# Patient Record
Sex: MALE | Race: WHITE | Employment: PART TIME | URBAN - METROPOLITAN AREA
[De-identification: names, ages, dates, MRNs, and addresses within clinical notes are randomized per-mention and may not be internally consistent; named-entity substitution may affect disease eponyms.]

---

## 2019-07-31 ENCOUNTER — OFFICE VISIT (OUTPATIENT)
Dept: URGENT CARE | Facility: CLINIC | Age: 69
End: 2019-07-31
Payer: COMMERCIAL

## 2019-07-31 VITALS
DIASTOLIC BLOOD PRESSURE: 90 MMHG | OXYGEN SATURATION: 96 % | HEIGHT: 70 IN | TEMPERATURE: 98.3 F | RESPIRATION RATE: 16 BRPM | SYSTOLIC BLOOD PRESSURE: 148 MMHG | WEIGHT: 193 LBS | BODY MASS INDEX: 27.63 KG/M2 | HEART RATE: 83 BPM

## 2019-07-31 DIAGNOSIS — J04.0 ACUTE LARYNGITIS: ICD-10-CM

## 2019-07-31 DIAGNOSIS — J02.9 SORE THROAT: Primary | ICD-10-CM

## 2019-07-31 LAB — S PYO AG THROAT QL: NEGATIVE

## 2019-07-31 PROCEDURE — 99213 OFFICE O/P EST LOW 20 MIN: CPT | Performed by: EMERGENCY MEDICINE

## 2019-07-31 PROCEDURE — 87880 STREP A ASSAY W/OPTIC: CPT | Performed by: EMERGENCY MEDICINE

## 2019-07-31 RX ORDER — AZITHROMYCIN 500 MG/1
500 TABLET, FILM COATED ORAL DAILY
Qty: 3 TABLET | Refills: 0 | Status: SHIPPED | OUTPATIENT
Start: 2019-07-31 | End: 2019-08-03

## 2019-07-31 RX ORDER — LORATADINE 10 MG/1
10 TABLET ORAL DAILY
COMMUNITY

## 2019-07-31 NOTE — PROGRESS NOTES
Assessment/Plan:    No problem-specific Assessment & Plan notes found for this encounter  Diagnoses and all orders for this visit:    Sore throat  -     POCT rapid strepA  -     azithromycin (ZITHROMAX) 500 MG tablet; Take 1 tablet (500 mg total) by mouth daily for 3 days    Acute laryngitis    Other orders  -     loratadine (CLARITIN) 10 mg tablet; Take 10 mg by mouth daily          Subjective:      Patient ID: Aleena Mitchell is a 79 y o  male  Sore throat, hoarseness for 2 days; denies fever, cough, sinus congestion    Sore Throat    This is a new problem  The current episode started in the past 7 days  The problem has been unchanged  Neither side of throat is experiencing more pain than the other  There has been no fever  The pain is at a severity of 3/10  The pain is mild  He has tried nothing for the symptoms  The treatment provided no relief  The following portions of the patient's history were reviewed and updated as appropriate: allergies, current medications, past family history, past medical history, past social history, past surgical history and problem list     Review of Systems   HENT: Positive for sore throat  All other systems reviewed and are negative  Objective:      /90   Pulse 83   Temp 98 3 °F (36 8 °C)   Resp 16   Ht 5' 10" (1 778 m)   Wt 87 5 kg (193 lb)   SpO2 96%   BMI 27 69 kg/m²          Physical Exam   Constitutional: He is oriented to person, place, and time  He appears well-developed and well-nourished  HENT:   Right Ear: Tympanic membrane and ear canal normal    Left Ear: Tympanic membrane and ear canal normal    Mouth/Throat: Mucous membranes are normal  Posterior oropharyngeal erythema present  Eyes: Pupils are equal, round, and reactive to light  Cardiovascular: Normal rate  Pulmonary/Chest: Effort normal    Abdominal: Soft  Neurological: He is alert and oriented to person, place, and time  Skin: Skin is warm and dry     Psychiatric: He has a normal mood and affect  His behavior is normal    Nursing note and vitals reviewed

## 2019-07-31 NOTE — PATIENT INSTRUCTIONS
Warm salt water gargles 3-4 x a day, Cepacol, Chloraseptic or Sucrets for throat pain, Zithromax daily for 3 days, recheck next week if symptoms persist

## 2021-03-04 DIAGNOSIS — Z23 ENCOUNTER FOR IMMUNIZATION: ICD-10-CM

## 2021-03-19 ENCOUNTER — IMMUNIZATIONS (OUTPATIENT)
Dept: FAMILY MEDICINE CLINIC | Facility: HOSPITAL | Age: 71
End: 2021-03-19

## 2021-03-19 DIAGNOSIS — Z23 ENCOUNTER FOR IMMUNIZATION: Primary | ICD-10-CM

## 2021-03-19 PROCEDURE — 91300 SARS-COV-2 / COVID-19 MRNA VACCINE (PFIZER-BIONTECH) 30 MCG: CPT

## 2021-03-19 PROCEDURE — 0001A SARS-COV-2 / COVID-19 MRNA VACCINE (PFIZER-BIONTECH) 30 MCG: CPT

## 2021-04-11 ENCOUNTER — IMMUNIZATIONS (OUTPATIENT)
Dept: FAMILY MEDICINE CLINIC | Facility: HOSPITAL | Age: 71
End: 2021-04-11

## 2021-04-11 DIAGNOSIS — Z23 ENCOUNTER FOR IMMUNIZATION: Primary | ICD-10-CM

## 2021-04-11 PROCEDURE — 0002A SARS-COV-2 / COVID-19 MRNA VACCINE (PFIZER-BIONTECH) 30 MCG: CPT

## 2021-04-11 PROCEDURE — 91300 SARS-COV-2 / COVID-19 MRNA VACCINE (PFIZER-BIONTECH) 30 MCG: CPT

## 2023-03-03 ENCOUNTER — APPOINTMENT (EMERGENCY)
Dept: CT IMAGING | Facility: HOSPITAL | Age: 73
End: 2023-03-03

## 2023-03-03 ENCOUNTER — HOSPITAL ENCOUNTER (EMERGENCY)
Facility: HOSPITAL | Age: 73
Discharge: HOME | End: 2023-03-03
Attending: EMERGENCY MEDICINE

## 2023-03-03 VITALS
RESPIRATION RATE: 16 BRPM | DIASTOLIC BLOOD PRESSURE: 78 MMHG | HEART RATE: 65 BPM | SYSTOLIC BLOOD PRESSURE: 153 MMHG | TEMPERATURE: 97.3 F | OXYGEN SATURATION: 97 %

## 2023-03-03 DIAGNOSIS — R77.8 ELEVATED TROPONIN: ICD-10-CM

## 2023-03-03 DIAGNOSIS — K82.9 GALLBLADDER DISEASE: ICD-10-CM

## 2023-03-03 DIAGNOSIS — I71.9 AORTIC ANEURYSM (HCC): ICD-10-CM

## 2023-03-03 DIAGNOSIS — R10.9 ABDOMINAL PAIN: Primary | ICD-10-CM

## 2023-03-03 LAB
2HR DELTA HS TROPONIN: 17 NG/L
4HR DELTA HS TROPONIN: 14 NG/L
ALBUMIN SERPL BCP-MCNC: 4.6 G/DL (ref 3.5–5)
ALP SERPL-CCNC: 67 U/L (ref 34–104)
ALT SERPL W P-5'-P-CCNC: 19 U/L (ref 7–52)
ANION GAP SERPL CALCULATED.3IONS-SCNC: 10 MMOL/L (ref 4–13)
APTT PPP: 25 SECONDS (ref 23–37)
AST SERPL W P-5'-P-CCNC: 22 U/L (ref 13–39)
ATRIAL RATE: 91 BPM
BACTERIA UR QL AUTO: NORMAL /HPF
BASOPHILS # BLD MANUAL: 0 THOUSAND/UL (ref 0–0.1)
BASOPHILS NFR MAR MANUAL: 0 % (ref 0–1)
BILIRUB SERPL-MCNC: 1.15 MG/DL (ref 0.2–1)
BILIRUB UR QL STRIP: NEGATIVE
BUN SERPL-MCNC: 16 MG/DL (ref 5–25)
CALCIUM SERPL-MCNC: 9.6 MG/DL (ref 8.4–10.2)
CARDIAC TROPONIN I PNL SERPL HS: 18 NG/L
CARDIAC TROPONIN I PNL SERPL HS: 32 NG/L
CARDIAC TROPONIN I PNL SERPL HS: 35 NG/L
CHLORIDE SERPL-SCNC: 100 MMOL/L (ref 96–108)
CLARITY UR: CLEAR
CO2 SERPL-SCNC: 27 MMOL/L (ref 21–32)
COLOR UR: YELLOW
CREAT SERPL-MCNC: 0.87 MG/DL (ref 0.6–1.3)
EOSINOPHIL # BLD MANUAL: 0 THOUSAND/UL (ref 0–0.4)
EOSINOPHIL NFR BLD MANUAL: 0 % (ref 0–6)
ERYTHROCYTE [DISTWIDTH] IN BLOOD BY AUTOMATED COUNT: 12.5 % (ref 11.6–15.1)
GFR SERPL CREATININE-BSD FRML MDRD: 87 ML/MIN/1.73SQ M
GLUCOSE SERPL-MCNC: 139 MG/DL (ref 65–140)
GLUCOSE UR STRIP-MCNC: NEGATIVE MG/DL
HCT VFR BLD AUTO: 50.5 % (ref 36.5–49.3)
HGB BLD-MCNC: 16.8 G/DL (ref 12–17)
HGB UR QL STRIP.AUTO: NEGATIVE
INR PPP: 0.88 (ref 0.84–1.19)
KETONES UR STRIP-MCNC: ABNORMAL MG/DL
LEUKOCYTE ESTERASE UR QL STRIP: NEGATIVE
LIPASE SERPL-CCNC: 29 U/L (ref 11–82)
LYMPHOCYTES # BLD AUTO: 0.31 THOUSAND/UL (ref 0.6–4.47)
LYMPHOCYTES # BLD AUTO: 3 % (ref 14–44)
MCH RBC QN AUTO: 30.3 PG (ref 26.8–34.3)
MCHC RBC AUTO-ENTMCNC: 33.3 G/DL (ref 31.4–37.4)
MCV RBC AUTO: 91 FL (ref 82–98)
MONOCYTES # BLD AUTO: 0.21 THOUSAND/UL (ref 0–1.22)
MONOCYTES NFR BLD: 2 % (ref 4–12)
NEUTROPHILS # BLD MANUAL: 9.91 THOUSAND/UL (ref 1.85–7.62)
NEUTS BAND NFR BLD MANUAL: 3 % (ref 0–8)
NEUTS SEG NFR BLD AUTO: 92 % (ref 43–75)
NITRITE UR QL STRIP: NEGATIVE
NON-SQ EPI CELLS URNS QL MICRO: NORMAL /HPF
P AXIS: 66 DEGREES
PH UR STRIP.AUTO: 6.5 [PH]
PLATELET # BLD AUTO: 149 THOUSANDS/UL (ref 149–390)
PLATELET BLD QL SMEAR: ADEQUATE
PMV BLD AUTO: 10.6 FL (ref 8.9–12.7)
POTASSIUM SERPL-SCNC: 3.8 MMOL/L (ref 3.5–5.3)
PR INTERVAL: 168 MS
PROT SERPL-MCNC: 7.8 G/DL (ref 6.4–8.4)
PROT UR STRIP-MCNC: ABNORMAL MG/DL
PROTHROMBIN TIME: 12.6 SECONDS (ref 11.6–14.5)
QRS AXIS: -58 DEGREES
QRSD INTERVAL: 106 MS
QT INTERVAL: 500 MS
QTC INTERVAL: 486 MS
RBC # BLD AUTO: 5.54 MILLION/UL (ref 3.88–5.62)
RBC #/AREA URNS AUTO: NORMAL /HPF
RBC MORPH BLD: NORMAL
SODIUM SERPL-SCNC: 137 MMOL/L (ref 135–147)
SP GR UR STRIP.AUTO: 1.02 (ref 1–1.03)
T WAVE AXIS: 25 DEGREES
UROBILINOGEN UR STRIP-ACNC: <2 MG/DL
VENTRICULAR RATE: 57 BPM
WBC # BLD AUTO: 10.43 THOUSAND/UL (ref 4.31–10.16)
WBC #/AREA URNS AUTO: NORMAL /HPF

## 2023-03-03 RX ORDER — FENTANYL CITRATE 50 UG/ML
50 INJECTION, SOLUTION INTRAMUSCULAR; INTRAVENOUS ONCE
Status: COMPLETED | OUTPATIENT
Start: 2023-03-03 | End: 2023-03-03

## 2023-03-03 RX ADMIN — IOHEXOL 100 ML: 350 INJECTION, SOLUTION INTRAVENOUS at 17:42

## 2023-03-03 RX ADMIN — SODIUM CHLORIDE 1000 ML: 0.9 INJECTION, SOLUTION INTRAVENOUS at 17:04

## 2023-03-03 RX ADMIN — FENTANYL CITRATE 50 MCG: 50 INJECTION INTRAMUSCULAR; INTRAVENOUS at 17:02

## 2023-03-03 NOTE — ED PROVIDER NOTES
History  Chief Complaint   Patient presents with   • Abdominal Pain     Patient c/o abdominal pain along with back pain along with hypertension that started this morning  Patient states"pain radiates from abdomen around to back like a band around mid section"  Patient was sent by PCP for evaluation  Patient reports spitting up but not vomiting  Patient denies diarrhea  Patient is a 80 y/o M with h/o CAD and HTN that presents to the ED with abdominal pain radiating to back that started this morning around 9AM   He states he had this same pain off and on for the past 10 days, but today the pain is constant and won't go away  He denies nausea, but has had vomiting off and on  No diarrhea or constipation  Last BM was this morning  No black or bloody stools  Patient does drink alcohol daily and states he has one drink with dinner  He did not take anything for pain  Nothing makes it worse, nothing makes it better  History provided by:  Patient  Abdominal Pain  Pain location:  Periumbilical  Pain quality: aching    Pain radiates to:  Back  Pain severity:  Moderate  Onset quality:  Sudden  Duration:  1 day  Timing:  Constant  Progression:  Worsening  Chronicity:  New  Context: not sick contacts, not suspicious food intake and not trauma    Relieved by:  Nothing  Worsened by:  Nothing  Ineffective treatments:  None tried  Associated symptoms: vomiting    Associated symptoms: no chest pain, no chills, no constipation, no cough, no diarrhea, no dysuria, no fever, no nausea and no sore throat    Risk factors: alcohol abuse and being elderly        Prior to Admission Medications   Prescriptions Last Dose Informant Patient Reported?  Taking?   loratadine (CLARITIN) 10 mg tablet   Yes No   Sig: Take 10 mg by mouth daily      Facility-Administered Medications: None       Past Medical History:   Diagnosis Date   • Coronary artery disease    • Hypertension        Past Surgical History:   Procedure Laterality Date   • BACK SURGERY     • CARDIAC OTHER      CABG open heart 2019 @ [facility]       History reviewed  No pertinent family history  I have reviewed and agree with the history as documented  E-Cigarette/Vaping   • E-Cigarette Use Never User      E-Cigarette/Vaping Substances     Social History     Tobacco Use   • Smoking status: Former   • Smokeless tobacco: Never   Vaping Use   • Vaping Use: Never used   Substance Use Topics   • Alcohol use: Yes     Comment: 1 drink per day   • Drug use: Yes     Types: Marijuana     Comment: daily       Review of Systems   Constitutional: Negative for chills and fever  HENT: Negative for sore throat  Respiratory: Negative for cough  Cardiovascular: Negative for chest pain  Gastrointestinal: Positive for abdominal pain and vomiting  Negative for blood in stool, constipation, diarrhea and nausea  Genitourinary: Negative for dysuria  Musculoskeletal: Positive for back pain  Skin: Negative for color change, pallor and rash  Neurological: Negative for dizziness, weakness, light-headedness and numbness  Psychiatric/Behavioral: Negative for confusion  All other systems reviewed and are negative  Physical Exam  Physical Exam  Vitals and nursing note reviewed  Constitutional:       General: He is not in acute distress  Appearance: Normal appearance  He is well-developed, well-groomed and normal weight  He is not ill-appearing or diaphoretic  HENT:      Head: Normocephalic and atraumatic  Right Ear: External ear normal       Left Ear: External ear normal       Nose: Nose normal       Mouth/Throat:      Mouth: Mucous membranes are moist    Eyes:      Conjunctiva/sclera: Conjunctivae normal       Pupils: Pupils are equal    Cardiovascular:      Rate and Rhythm: Normal rate and regular rhythm  Heart sounds: Normal heart sounds  Pulmonary:      Effort: Pulmonary effort is normal       Breath sounds: Normal breath sounds   No wheezing, rhonchi or rales    Abdominal:      General: Abdomen is flat  Bowel sounds are normal       Palpations: Abdomen is soft  Tenderness: There is no abdominal tenderness  There is no guarding or rebound  Musculoskeletal:      Cervical back: Normal range of motion and neck supple  Right lower leg: No edema  Left lower leg: No edema  Skin:     General: Skin is warm and dry  Coloration: Skin is not jaundiced or pale  Findings: No rash  Neurological:      General: No focal deficit present  Mental Status: He is alert and oriented to person, place, and time  Cranial Nerves: No cranial nerve deficit  Motor: No weakness  Psychiatric:         Mood and Affect: Mood normal          Behavior: Behavior is cooperative  Vital Signs  ED Triage Vitals   Temperature Pulse Respirations Blood Pressure SpO2   03/03/23 1533 03/03/23 1533 03/03/23 1533 03/03/23 1535 03/03/23 1533   (!) 97 3 °F (36 3 °C) (!) 46 18 (!) 215/103 97 %      Temp src Heart Rate Source Patient Position - Orthostatic VS BP Location FiO2 (%)   -- 03/03/23 1630 03/03/23 1630 03/03/23 1630 --    Monitor Lying Left arm       Pain Score       03/03/23 1533       8           Vitals:    03/03/23 1830 03/03/23 1900 03/03/23 1930 03/03/23 2000   BP: 168/88 163/78 158/82 126/85   Pulse: 64 70 76 75   Patient Position - Orthostatic VS: Lying Lying Lying Lying         Visual Acuity      ED Medications  Medications   fentanyl citrate (PF) 100 MCG/2ML 50 mcg (50 mcg Intravenous Given 3/3/23 1702)   sodium chloride 0 9 % bolus 1,000 mL (0 mL Intravenous Stopped 3/3/23 1800)   iohexol (OMNIPAQUE) 350 MG/ML injection (SINGLE-DOSE) 100 mL (100 mL Intravenous Given 3/3/23 1742)       Diagnostic Studies  Results Reviewed     Procedure Component Value Units Date/Time    HS Troponin I 4hr [[id number]] Collected: 03/03/23 2043    Lab Status:  In process Specimen: Blood from Arm, Right Updated: 03/03/23 2047    HS Troponin I 2hr [[id number]] (Normal) Collected: 03/03/23 1839    Lab Status: Final result Specimen: Blood from Arm, Right Updated: 03/03/23 1942     hs TnI 2hr 35 ng/L      Delta 2hr hsTnI 17 ng/L     Urine Microscopic [[id number]]  (Normal) Collected: 03/03/23 1704    Lab Status: Final result Specimen: Urine, Clean Catch Updated: 03/03/23 1839     RBC, UA 0-1 /hpf      WBC, UA None Seen /hpf      Epithelial Cells None Seen /hpf      Bacteria, UA None Seen /hpf     UA w Reflex to Microscopic w Reflex to Culture [[id number]]  (Abnormal) Collected: 03/03/23 1704    Lab Status: Final result Specimen: Urine, Clean Catch Updated: 03/03/23 1754     Color, UA Yellow     Clarity, UA Clear     Specific [place], UA 1 020     pH, UA 6 5     Leukocytes, UA Negative     Nitrite, UA Negative     Protein, UA Trace mg/dl      Glucose, UA Negative mg/dl      Ketones, UA 10 (1+) mg/dl      Urobilinogen, UA <2 0 mg/dl      Bilirubin, UA Negative     Occult Blood, UA Negative    CBC and differential [[id number]]  (Abnormal) Collected: 03/03/23 1623    Lab Status: Final result Specimen: Blood from Arm, Right Updated: 03/03/23 1731     WBC 10 43 Thousand/uL      RBC 5 54 Million/uL      Hemoglobin 16 8 g/dL      Hematocrit 50 5 %      MCV 91 fL      MCH 30 3 pg      MCHC 33 3 g/dL      RDW 12 5 %      MPV 10 6 fL      Platelets 213 Thousands/uL     Narrative: This is an appended report  These results have been appended to a previously verified report      Manual Differential(PHLEBS Do Not Order) [[id number]]  (Abnormal) Collected: 03/03/23 1623    Lab Status: Final result Specimen: Blood from Arm, Right Updated: 03/03/23 1731     Segmented % 92 %      Bands % 3 %      Lymphocytes % 3 %      Monocytes % 2 %      Eosinophils, % 0 %      Basophils % 0 %      Absolute Neutrophils 9 91 Thousand/uL      Lymphocytes Absolute 0 31 Thousand/uL      Monocytes Absolute 0 21 Thousand/uL      Eosinophils Absolute 0 00 Thousand/uL      Basophils Absolute 0 00 Thousand/uL Total Counted --     RBC Morphology Normal     Platelet Estimate Adequate    Protime-INR [[id number]]  (Normal) Collected: 03/03/23 1623    Lab Status: Final result Specimen: Blood from Arm, Right Updated: 03/03/23 1700     Protime 12 6 seconds      INR 0 88    APTT [[id number]]  (Normal) Collected: 03/03/23 1623    Lab Status: Final result Specimen: Blood from Arm, Right Updated: 03/03/23 1700     PTT 25 seconds     HS Troponin 0hr (reflex protocol) [[id number]]  (Normal) Collected: 03/03/23 1623    Lab Status: Final result Specimen: Blood from Arm, Right Updated: 03/03/23 1700     hs TnI 0hr 18 ng/L     Comprehensive metabolic panel [[id number]]  (Abnormal) Collected: 03/03/23 1623    Lab Status: Final result Specimen: Blood from Arm, Right Updated: 03/03/23 1654     Sodium 137 mmol/L      Potassium 3 8 mmol/L      Chloride 100 mmol/L      CO2 27 mmol/L      ANION GAP 10 mmol/L      BUN 16 mg/dL      Creatinine 0 87 mg/dL      Glucose 139 mg/dL      Calcium 9 6 mg/dL      AST 22 U/L      ALT 19 U/L      Alkaline Phosphatase 67 U/L      Total Protein 7 8 g/dL      Albumin 4 6 g/dL      Total Bilirubin 1 15 mg/dL      eGFR 87 ml/min/1 73sq m     Narrative:      [place] guidelines for Chronic Kidney Disease (CKD):   •  Stage 1 with normal or high GFR (GFR > 90 mL/min/1 73 square meters)  •  Stage 2 Mild CKD (GFR = 60-89 mL/min/1 73 square meters)  •  Stage 3A Moderate CKD (GFR = 45-59 mL/min/1 73 square meters)  •  Stage 3B Moderate CKD (GFR = 30-44 mL/min/1 73 square meters)  •  Stage 4 Severe CKD (GFR = 15-29 mL/min/1 73 square meters)  •  Stage 5 End Stage CKD (GFR <15 mL/min/1 73 square meters)  Note: GFR calculation is accurate only with a steady state creatinine    Lipase [[id number]]  (Normal) Collected: 03/03/23 1623    Lab Status: Final result Specimen: Blood from Arm, Right Updated: 03/03/23 1654     Lipase 29 u/L                  CTA dissection protocol chest abdomen pelvis w wo contrast   Final Result by Zenobia Hi MD (03/03 1853)         1  No dissection of the thoracic or abdominal aorta  2   Cholelithiasis with CT findings highly suspicious for acute cholecystitis  Evaluation with right upper quadrant ultrasound is recommended for confirmation  3  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  Follow-up evaluation with CT thorax is recommended in one year interval    4   Colonic diverticulosis with circular muscle hypertrophy at the level of the sigmoid colon  Correlate with colonoscopy to exclude underlying mucosal abnormality  The study was marked in [facility] for immediate notification  Workstation performed: [id number]         [address] [facility] right upper quadrant    (Results Pending)              Procedures  ECG 12 Lead Documentation Only    Date/Time: 3/3/2023 3:49 PM  Performed by: Kevan Menendez PA-C  Authorized by: Kevan Menendez PA-C     Indications / Diagnosis:  Abdominal pain  ECG reviewed by me, the ED Provider: yes    Patient location:  ED  Previous ECG:     Previous ECG:  Compared to current    Comparison ECG info:  PAC now present    Similarity:  Changes noted  Rate:     ECG rate:  57  Rhythm:     Rhythm: sinus bradycardia    Ectopy:     Ectopy: PAC    Conduction:     Conduction: abnormal      Abnormal conduction: incomplete RBBB    ST segments:     ST segments:  Normal  T waves:     T waves: normal               ED Course  ED Course as of 03/03/23 2100   Fri Mar 03, 2023   1902 Surgery paged concerning abnormal CT scan  Sylvie Rutherford from surgery currently in room with patient  2012 SLIM paged for admission  2033 Stephanie would like to wait for the 3rd troponin to result  Patient notified  According to note by surgery AP, patient can go home with outpatient ultrasound  2042 Care transferred to DR Saul Aguilera  Delta trop pending                 HEART Risk Score    Flowsheet Row Most Recent Value   Heart Score Risk Calculator History 0 Filed at: 03/03/2023 2023   ECG 1 Filed at: 03/03/2023 2023   Age 2 Filed at: 03/03/2023 2023   Risk Factors 2 Filed at: 03/03/2023 2023   Troponin 1 Filed at: 03/03/2023 2023   HEART Score 6 Filed at: 03/03/2023 2023                        SBIRT 22yo+    Flowsheet Row Most Recent Value   SBIRT (23 yo +)    In order to provide better care to our patients, we are screening all of our patients for alcohol and drug use  Would it be okay to ask you these screening questions? Yes Filed at: 03/03/2023 1700   Initial Alcohol Screen: US AUDIT-C     1  How often do you have a drink containing alcohol? 3 Filed at: 03/03/2023 1700   2  How many drinks containing alcohol do you have on a typical day you are drinking? 1 Filed at: 03/03/2023 1700   3a  Male UNDER 65: How often do you have five or more drinks on one occasion? 0 Filed at: 03/03/2023 1700   3b  FEMALE Any Age, or MALE 65+: How often do you have 4 or more drinks on one occassion? 0 Filed at: 03/03/2023 1700   Audit-C Score 4 Filed at: 03/03/2023 1700   EDY: How many times in the past year have you    Used an illegal drug or used a prescription medication for non-medical reasons? Never Filed at: 03/03/2023 1700                    Medical Decision Making  Patient with abdominal pain radiating to back upon arrival, will order labs, CT scan to r/o cardiopulmonary disease or aortic dissection  Patient with possible gallbladder disease on CT scan  Patient's pain resolved while in the ER, surgery consulted concerning abnormal CT scan  Surgery suggests outpatient follow up and u/s  Patient with elevated trop, elevated Delta trop, d/w SLIM repeat trop pending, care transferred to DR Hughes  Abdominal pain: acute illness or injury  Aortic aneurysm [facility]): acute illness or injury  Elevated troponin: acute illness or injury  Gallbladder disease: acute illness or injury  Amount and/or Complexity of Data Reviewed  External Data Reviewed: ECG    Labs: ordered  Radiology: ordered  ECG/medicine tests: ordered and independent interpretation performed  Risk  Prescription drug management  Disposition  Final diagnoses:   Abdominal pain   Gallbladder disease   Elevated troponin   Aortic aneurysm ([address] )     Time reflects when diagnosis was documented in both MDM as applicable and the Disposition within this note     Time User Action Codes Description Comment    3/3/2023  7:25 PM Kathreen Ask Add [R10 9] Abdominal pain     3/3/2023  7:25 PM Kathreen Ask Add [K82 9] Gallbladder disease     3/3/2023  8:13 PM Kathreen Ask Add [R77 8] Elevated troponin     3/3/2023  8:13 PM Kathreen Ask Add [I71 9] Aortic aneurysm [facility])       ED Disposition     None      Follow-up Information     Follow up With Specialties Details Why Contact Info Additional Information    Arian Carlos MD General Surgery Schedule an appointment as soon as possible for a visit call to schedule an appointment to discuss options after ultrasound [address]       Jean-Pierre Ellison MD Family Medicine Schedule an appointment as soon as possible for a visit  for monitoring of aortic aneurysm on a yearly basis  [address] [facility] Emergency Medicine Go to  If symptoms worsen, fevers, vomiting  [address] [place],[address] [address]  [address] [facility] Emergency Department, [address] [facility] Neil Prather [address]          Patient's Medications   Discharge Prescriptions    No medications on file       Outpatient Discharge Orders   US right upper quadrant   Standing Status: Future Standing Exp   Date: 03/03/27       PDMP Review     None          ED Provider  Electronically Signed by           Surinder Love PA-C  03/03/23 1659

## 2023-03-04 LAB
ATRIAL RATE: 75 BPM
P AXIS: 60 DEGREES
PR INTERVAL: 170 MS
QRS AXIS: -70 DEGREES
QRSD INTERVAL: 100 MS
QT INTERVAL: 432 MS
QTC INTERVAL: 482 MS
T WAVE AXIS: -16 DEGREES
VENTRICULAR RATE: 75 BPM

## 2023-03-04 NOTE — CONSULTS
Consultation - General Surgery   Shaina Elizalde 81 y o  male MRN: [id number]  Unit/Bed#: ED 02 Encounter: [id number]    Assessment/Plan     Assessment/Plan:    Abdominal pain, cholelithiasis:  - patient presenting with multiple days of generalized abdominal pain  - abdominal exam is completely benign, NTND  Negative [place]  - CT reviewed, there is a gallstone at the neck with patent cystic duct  - LFTs are within normal limits, wbc minimally elevated  - Clinical presentation and exam are not consistent with acute cholecystitis, patient can undergo outpatient ultrasounds and follow up with general surgery outpatient for discussion of elective cholecystectomy  No acute surgical intervention currently indicated  - recommend low fat diet  - discussed with on call surgeon      History of Present Illness     HPI:  Shaina Elizalde is a 81 y o  male with pmhx of HTN who presents with generalized abdominal pain that has been on and off for 10 days  States it would start in the bilateral lower quadrants and expand to his entire abdomen  He states the pain felt like a band around his abdomen  He states the pain would occur at random times, not associated with meals  He admits to vomiting and 'spitting up,' but denies nausea  He denies any fevers, chills, changes in bowel habits, urinary sx  He denies any past history of pain like this  States he takes medications for hypertension  Admits to allergy to vitamin E, for which his reaction is itching  Denies any other allergies  Consult to surgery general  Consult performed by: Jose Wasserman PA-C  Consult ordered by: Kevan Menendez PA-C          Review of Systems   Constitutional: Negative for chills and fever  HENT: Negative for ear pain and sore throat  Eyes: Negative for pain and visual disturbance  Respiratory: Negative for cough and shortness of breath  Cardiovascular: Negative for chest pain and palpitations     Gastrointestinal: Positive for abdominal pain and vomiting  Negative for constipation, diarrhea and nausea  Genitourinary: Negative for decreased urine volume, dysuria and hematuria  Musculoskeletal: Negative for arthralgias and back pain  Skin: Negative for color change and rash  Neurological: Negative for syncope and light-headedness  All other systems reviewed and are negative  Historical Information   Past Medical History:   Diagnosis Date   • Coronary artery disease    • Hypertension      Past Surgical History:   Procedure Laterality Date   • BACK SURGERY     • CARDIAC OTHER      [facility] [address] [facility]   Social History     Substance and Sexual Activity   Alcohol Use Yes    Comment: 1 drink per day     Social History     Substance and Sexual Activity   Drug Use Yes   • Types: Marijuana    Comment: daily     E-Cigarette/Vaping   • E-Cigarette Use Never User      E-Cigarette/Vaping Substances     Social History     Tobacco Use   Smoking Status Former   Smokeless Tobacco Never     Family History: History reviewed  No pertinent family history  Meds/Allergies   PTA meds:   Prior to Admission Medications   Prescriptions Last Dose Informant Patient Reported?  Taking?   loratadine (CLARITIN) 10 mg tablet   Yes No   Sig: Take 10 mg by mouth daily      Facility-Administered Medications: None     Allergies   Allergen Reactions   • Vitamin E Itching       Objective   First Vitals:   Blood Pressure: (!) 215/103 (03/03/23 1535)  Pulse: (!) 46 (03/03/23 1533)  Temperature: (!) 97 3 °F (36 3 °C) (03/03/23 1533)  Respirations: 18 (03/03/23 1533)  SpO2: 97 % (03/03/23 1533)    Current Vitals:   Blood Pressure: 163/78 (03/03/23 1900)  Pulse: 70 (03/03/23 1900)  Temperature: (!) 97 3 °F (36 3 °C) (03/03/23 1533)  Respirations: 21 (03/03/23 1900)  SpO2: 97 % (03/03/23 1900)    No intake or output data in the 24 hours ending 03/03/23 1951    Invasive Devices     Peripheral Intravenous Line  Duration           Peripheral IV 03/03/23 Distal;Right;Upper;Ventral (anterior) Antecubital <1 day                Physical Exam  Vitals and nursing note reviewed  Constitutional:       General: He is not in acute distress  Appearance: Normal appearance  He is well-developed  He is not ill-appearing  HENT:      Head: Normocephalic and atraumatic  Eyes:      General: No scleral icterus  Extraocular Movements: Extraocular movements intact  Conjunctiva/sclera: Conjunctivae normal    Cardiovascular:      Rate and Rhythm: Normal rate and regular rhythm  Heart sounds: No murmur heard  Pulmonary:      Effort: Pulmonary effort is normal  No respiratory distress  Breath sounds: Normal breath sounds  Abdominal:      General: Bowel sounds are normal  There is no distension  Palpations: Abdomen is soft  Tenderness: There is no abdominal tenderness  There is no guarding or rebound  Comments: Bowel sounds active, abdomen soft  Nontender, nondistended  No peritoneal signs  Negative [place]  Musculoskeletal:         General: No swelling  Cervical back: Neck supple  Skin:     General: Skin is warm and dry  Capillary Refill: Capillary refill takes less than 2 seconds  Neurological:      General: No focal deficit present  Mental Status: He is alert  Psychiatric:         Mood and Affect: Mood normal          Lab Results:   I have personally reviewed pertinent lab results    , CBC:   Lab Results   Component Value Date    WBC 10 43 (H) 03/03/2023    HGB 16 8 03/03/2023    HCT 50 5 (H) 03/03/2023    MCV 91 03/03/2023     03/03/2023    MCH 30 3 03/03/2023    MCHC 33 3 03/03/2023    RDW 12 5 03/03/2023    MPV 10 6 03/03/2023   , CMP:   Lab Results   Component Value Date    SODIUM 137 03/03/2023    K 3 8 03/03/2023     03/03/2023    CO2 27 03/03/2023    BUN 16 03/03/2023    CREATININE 0 87 03/03/2023    CALCIUM 9 6 03/03/2023    AST 22 03/03/2023    ALT 19 03/03/2023    ALKPHOS 67 03/03/2023 EGFR 87 03/03/2023   , Coagulation:   Lab Results   Component Value Date    INR 0 88 03/03/2023   , Lipase:   Lab Results   Component Value Date    LIPASE 29 03/03/2023     Imaging: I have personally reviewed pertinent reports  EKG, Pathology, and Other Studies: I have personally reviewed pertinent reports        Davina Prather PA-C

## 2023-03-07 ENCOUNTER — HOSPITAL ENCOUNTER (OUTPATIENT)
Dept: ULTRASOUND IMAGING | Facility: HOSPITAL | Age: 73
Discharge: HOME | End: 2023-03-07

## 2023-03-07 DIAGNOSIS — R10.9 ABDOMINAL PAIN: ICD-10-CM

## 2023-03-07 DIAGNOSIS — K82.9 GALLBLADDER DISEASE: ICD-10-CM

## 2023-03-16 ENCOUNTER — CONSULT (OUTPATIENT)
Dept: SURGERY | Facility: HOSPITAL | Age: 73
End: 2023-03-16

## 2023-03-16 VITALS
SYSTOLIC BLOOD PRESSURE: 118 MMHG | BODY MASS INDEX: 25.77 KG/M2 | WEIGHT: 174 LBS | HEIGHT: 69 IN | RESPIRATION RATE: 16 BRPM | TEMPERATURE: 97.7 F | DIASTOLIC BLOOD PRESSURE: 75 MMHG | HEART RATE: 50 BPM

## 2023-03-16 DIAGNOSIS — K82.8 BILIARY DYSKINESIA: Primary | ICD-10-CM

## 2023-03-16 RX ORDER — AMLODIPINE BESYLATE AND ATORVASTATIN CALCIUM 5; 80 MG/1; MG/1
TABLET, FILM COATED ORAL
COMMUNITY
Start: 2019-12-18

## 2023-03-16 RX ORDER — LORATADINE 10 MG/1
10 TABLET ORAL
COMMUNITY

## 2023-03-16 RX ORDER — LOSARTAN POTASSIUM AND HYDROCHLOROTHIAZIDE 12.5; 5 MG/1; MG/1
1 TABLET ORAL DAILY
COMMUNITY
Start: 2023-01-17

## 2023-03-24 NOTE — PROGRESS NOTES
Assessment/Plan:   Angelica Vazquez is a 81 y o male who is here for Abdominal Pain (New patient evaluated at [facility] ER on 3/3/23 for abdominal pain that radiated to back  History of CAD with CABG done in 2019 and HTN  CT of chest and abdomen as well as US done  Noted distended gallbladder and suspicion for cholelithiasis/cholecystitis  Patient denied any RUQ pain that radiates to shoulder or back  No n/v/f/c )    Plan: Abdominal pain - contracted gallbladder but no stones, ? Biliary etiology of pain, will obtain a HIDA to r/o biliary dyskinsia, cont abdominal pain log, f/u after HIDA      Preoperative Clearance: None    HPI:  Angelica Vazquez is a 81 y o male who was referred for evaluation of Abdominal Pain (New patient evaluated at [address] [place] [address] ER on 3/3/23 for abdominal pain that radiated to back  History of CAD with CABG done in 2019 and HTN  CT of chest and abdomen as well as US done  Noted distended gallbladder and suspicion for cholelithiasis/cholecystitis  Patient denied any RUQ pain that radiates to shoulder or back  No n/v/f/c )    Currently no pain currently       ROS:  General ROS: negative  negative for - chills, fatigue, fever or night sweats, weight loss  Respiratory ROS: no cough, shortness of breath, or wheezing  Cardiovascular ROS: no chest pain or dyspnea on exertion  Genito-Urinary ROS: no dysuria, trouble voiding, or hematuria  Musculoskeletal ROS: negative for - gait disturbance, joint pain or muscle pain  Neurological ROS: no TIA or stroke symptoms  abdominal pain    [unfilled]  Vitamin e    Current Outpatient Medications:   •  amLODIPine-atorvastatin (CADUET) 5-80 MG per tablet, , Disp: , Rfl:   •  loratadine (CLARITIN) 10 mg tablet, Take 10 mg by mouth daily, Disp: , Rfl:   •  loratadine (CLARITIN) 10 mg tablet, Take 10 mg by mouth, Disp: , Rfl:   •  losartan-hydrochlorothiazide (HYZAAR) 50-12 5 mg per tablet, Take 1 tablet by mouth daily, Disp: , Rfl:   Past Medical History:   Diagnosis Date   • Coronary artery disease    • Hypertension      Past Surgical History:   Procedure Laterality Date   • BACK SURGERY     • CARDIAC OTHER      CABG open heart 2019 @ [facility]     History reviewed  No pertinent family history  reports that he has quit smoking  He has never used smokeless tobacco  He reports current alcohol use  He reports current drug use  Drug: Marijuana  Labs:   Lab Results   Component Value Date    WBC 10 43 (H) 03/03/2023    HGB 16 8 03/03/2023     03/03/2023     Lab Results   Component Value Date    ALT 19 03/03/2023    AST 22 03/03/2023     This SmartLink has not been configured with any valid records  PHYSICAL EXAM  General Appearance:    Alert, cooperative, no distress,    Head:    Normocephalic without obvious abnormality   Eyes:    PERRL, conjunctiva/corneas clear, EOM's intact        Neck:   Supple, no adenopathy, no JVD   Back:     Symmetric, no spinal or CVA tenderness   Lungs:     Clear to auscultation bilaterally, no wheezing or rhonchi   Heart:    Regular rate and rhythm, S1 and S2 normal, no murmur   Abdomen:     Soft +BS ND NT   Extremities:   Extremities normal  No clubbing, cyanosis or edema   Psych:   Normal Affect, AOx3  Neurologic:  Skin:   CNII-XII intact  Strength symmetric, speech intact    Warm, dry, intact, no visible rashes or lesions         Physical Exam              Some portions of this record may have been generated with voice recognition software  There may be translation, syntax,  or grammatical errors  Occasional wrong word or "sound-a-like" substitutions may have occurred due to the inherent limitations of the voice recognition software  Read the chart carefully and recognize, using context, where substitutions may have occurred  If you have any questions, please contact the dictating provider for clarification or correction, as needed  This encounter has been coded by a non-certified coder

## 2023-03-29 ENCOUNTER — HOSPITAL ENCOUNTER (OUTPATIENT)
Dept: NUCLEAR MEDICINE | Facility: HOSPITAL | Age: 73
Discharge: HOME | End: 2023-03-29
Attending: SURGERY

## 2023-03-29 DIAGNOSIS — K82.8 BILIARY DYSKINESIA: ICD-10-CM

## 2023-03-29 RX ADMIN — SINCALIDE 1.6 MCG: 5 INJECTION, POWDER, LYOPHILIZED, FOR SOLUTION INTRAVENOUS at 09:56
